# Patient Record
Sex: FEMALE | Race: WHITE
[De-identification: names, ages, dates, MRNs, and addresses within clinical notes are randomized per-mention and may not be internally consistent; named-entity substitution may affect disease eponyms.]

---

## 2017-05-12 ENCOUNTER — HOSPITAL ENCOUNTER (OUTPATIENT)
Dept: HOSPITAL 45 - C.LABSPEC | Age: 27
Discharge: HOME | End: 2017-05-12
Attending: PHYSICIAN ASSISTANT
Payer: COMMERCIAL

## 2017-05-12 DIAGNOSIS — N89.8: Primary | ICD-10-CM

## 2017-05-16 ENCOUNTER — HOSPITAL ENCOUNTER (OUTPATIENT)
Dept: HOSPITAL 45 - C.LABSPEC | Age: 27
Discharge: HOME | End: 2017-05-16
Attending: OBSTETRICS & GYNECOLOGY
Payer: COMMERCIAL

## 2017-05-16 DIAGNOSIS — Z34.91: Primary | ICD-10-CM

## 2017-05-16 LAB
APPEARANCE UR: CLEAR
BILIRUB UR-MCNC: (no result) MG/DL
COLOR UR: YELLOW
MANUAL MICROSCOPIC REQUIRED?: NO
NITRITE UR QL STRIP: (no result)
PH UR STRIP: 6 [PH] (ref 4.5–7.5)
REVIEW REQ?: NO
SP GR UR STRIP: 1.01 (ref 1–1.03)
URINE BILL WITH OR WITHOUT MIC: (no result)
UROBILINOGEN UR-MCNC: (no result) MG/DL

## 2017-05-23 ENCOUNTER — HOSPITAL ENCOUNTER (OUTPATIENT)
Dept: HOSPITAL 45 - C.LABSPEC | Age: 27
Discharge: HOME | End: 2017-05-23
Attending: OBSTETRICS & GYNECOLOGY
Payer: COMMERCIAL

## 2017-05-23 ENCOUNTER — HOSPITAL ENCOUNTER (OUTPATIENT)
Dept: HOSPITAL 45 - C.LAB1850 | Age: 27
Discharge: HOME | End: 2017-05-23
Attending: OBSTETRICS & GYNECOLOGY
Payer: COMMERCIAL

## 2017-05-23 ENCOUNTER — HOSPITAL ENCOUNTER (OUTPATIENT)
Dept: HOSPITAL 45 - C.PAPS | Age: 27
Discharge: HOME | End: 2017-05-23
Attending: OBSTETRICS & GYNECOLOGY
Payer: COMMERCIAL

## 2017-05-23 DIAGNOSIS — Z34.91: Primary | ICD-10-CM

## 2017-05-23 LAB
BASOPHILS # BLD: 0.02 K/UL (ref 0–0.2)
BASOPHILS NFR BLD: 0.2 %
COMPLETE: YES
EOSINOPHIL NFR BLD AUTO: 263 K/UL (ref 130–400)
HCT VFR BLD CALC: 39.1 % (ref 37–47)
IG%: 0.1 %
IMM GRANULOCYTES NFR BLD AUTO: 27.3 %
LYMPHOCYTES # BLD: 2.27 K/UL (ref 1.2–3.4)
MCH RBC QN AUTO: 29.5 PG (ref 25–34)
MCHC RBC AUTO-ENTMCNC: 33 G/DL (ref 32–36)
MCV RBC AUTO: 89.5 FL (ref 80–100)
MONOCYTES NFR BLD: 6.7 %
NEUTROPHILS # BLD AUTO: 1.4 %
NEUTROPHILS NFR BLD AUTO: 64.3 %
PMV BLD AUTO: 10.4 FL (ref 7.4–10.4)
RBC # BLD AUTO: 4.37 M/UL (ref 4.2–5.4)
WBC # BLD AUTO: 8.32 K/UL (ref 4.8–10.8)

## 2017-05-26 LAB
CHLAMYDIA TRACH RNA***: NOT DETECTED
GC (NEIS GONORRHOEAE)RNA**: NOT DETECTED

## 2017-07-25 ENCOUNTER — HOSPITAL ENCOUNTER (OUTPATIENT)
Dept: HOSPITAL 45 - C.LAB1850 | Age: 27
Discharge: HOME | End: 2017-07-25
Attending: OBSTETRICS & GYNECOLOGY
Payer: COMMERCIAL

## 2017-07-25 DIAGNOSIS — Z34.91: Primary | ICD-10-CM

## 2017-07-25 LAB — GTGD: 50 GRAMS

## 2017-10-10 ENCOUNTER — HOSPITAL ENCOUNTER (OUTPATIENT)
Dept: HOSPITAL 45 - C.LAB1850 | Age: 27
Discharge: HOME | End: 2017-10-10
Attending: OBSTETRICS & GYNECOLOGY
Payer: COMMERCIAL

## 2017-10-10 DIAGNOSIS — Z34.92: Primary | ICD-10-CM

## 2017-10-10 LAB
APPEARANCE UR: CLEAR
BILIRUB UR-MCNC: (no result) MG/DL
COLOR UR: YELLOW
GTGD: 50 GRAMS
HCT VFR BLD CALC: 32.7 % (ref 37–47)
MANUAL MICROSCOPIC REQUIRED?: NO
NITRITE UR QL STRIP: (no result)
PH UR STRIP: 6.5 [PH] (ref 4.5–7.5)
REVIEW REQ?: NO
SP GR UR STRIP: 1.01 (ref 1–1.03)
URINE EPITHELIAL CELL AUTO: >30 /LPF (ref 0–5)
UROBILINOGEN UR-MCNC: (no result) MG/DL

## 2017-12-05 ENCOUNTER — HOSPITAL ENCOUNTER (OUTPATIENT)
Dept: HOSPITAL 45 - C.LABSPEC | Age: 27
Discharge: HOME | End: 2017-12-05
Attending: OBSTETRICS & GYNECOLOGY
Payer: COMMERCIAL

## 2017-12-05 DIAGNOSIS — Z34.93: Primary | ICD-10-CM

## 2018-01-03 ENCOUNTER — HOSPITAL ENCOUNTER (INPATIENT)
Dept: HOSPITAL 45 - C.LD | Age: 28
LOS: 2 days | Discharge: HOME | End: 2018-01-05
Attending: OBSTETRICS & GYNECOLOGY | Admitting: OBSTETRICS & GYNECOLOGY
Payer: COMMERCIAL

## 2018-01-03 ENCOUNTER — HOSPITAL ENCOUNTER (OUTPATIENT)
Dept: HOSPITAL 45 - C.LD | Age: 28
Discharge: HOME | End: 2018-01-03
Attending: OBSTETRICS & GYNECOLOGY
Payer: COMMERCIAL

## 2018-01-03 VITALS
WEIGHT: 190.48 LBS | HEIGHT: 65.98 IN | HEIGHT: 65.98 IN | BODY MASS INDEX: 30.61 KG/M2 | WEIGHT: 190.48 LBS | BODY MASS INDEX: 30.61 KG/M2

## 2018-01-03 VITALS
BODY MASS INDEX: 30.93 KG/M2 | HEIGHT: 65.98 IN | HEIGHT: 65.98 IN | WEIGHT: 192.46 LBS | WEIGHT: 192.46 LBS | BODY MASS INDEX: 30.93 KG/M2

## 2018-01-03 DIAGNOSIS — O48.0: Primary | ICD-10-CM

## 2018-01-03 DIAGNOSIS — Z3A.40: ICD-10-CM

## 2018-01-03 LAB
EOSINOPHIL NFR BLD AUTO: 281 K/UL (ref 130–400)
HCT VFR BLD CALC: 35.9 % (ref 37–47)
HGB BLD-MCNC: 12.1 G/DL (ref 12–16)
MCH RBC QN AUTO: 30 PG (ref 25–34)
MCHC RBC AUTO-ENTMCNC: 33.7 G/DL (ref 32–36)
MCV RBC AUTO: 89.1 FL (ref 80–100)
PMV BLD AUTO: 11 FL (ref 7.4–10.4)
RED CELL DISTRIBUTION WIDTH CV: 13.6 % (ref 11.5–14.5)
RED CELL DISTRIBUTION WIDTH SD: 44.7 FL (ref 36.4–46.3)
WBC # BLD AUTO: 11.11 K/UL (ref 4.8–10.8)

## 2018-01-03 PROCEDURE — 0KQM0ZZ REPAIR PERINEUM MUSCLE, OPEN APPROACH: ICD-10-PCS | Performed by: OBSTETRICS & GYNECOLOGY

## 2018-01-03 NOTE — ANESTHESIA PROCEDURE NOTE
Anesthesia Epidural Removal Nt


Date & Time


Tony 3, 2018 at 22:21





Vital Signs


Pain Intensity:  0.0





Notes


Mental Status:  alert / awake / arousable, participated in evaluation


Nausea / Vomiting:  adequately controlled


Pain:  adequately controlled


Airway Patency, RR, SpO2:  stable & adequate


BP & HR:  stable & adequate


Hydration State:  stable & adequate


Neuraxial Anesthesia:  was administered, sensory block is resolving


Anesthetic Complications:  no major complications apparent, pt satisfied with 

anesthetic care


Epidural:  removed without complications, with tip intact

## 2018-01-03 NOTE — DELIVERY SUMMARY
DATE OF OPERATION:  2018

 

DATE OF DELIVERY:  2018

 

FINDINGS:  Viable male infant with Apgars of 7 and 8.  Arterial and venous

cord gases are pending.  Moderate meconium with meconium stained baby and

placenta.  Placenta sent for pathological evaluation.  Small midline

laceration repaired with interrupted 4-0 Vicryl.  Estimated blood loss 300

mL.

 

LABOR NOTE:  The patient is a 27-year-old  3, para 1 with an EDC of

 at 40+ weeks gestational age; who presented to labor and

delivery in active labor.  The patient had been seen earlier in the office

today.  Contractions increased in intensity, and patient presented to labor

and delivery.  The patient denies rupture of membranes.  The patient's

prenatal course remarkable for possible debris noted in the lateral

ventricles at a 20-week anatomy ultrasound.  The patient had a subsequent

maternal fetal medicine consult, who could not confirm this finding. 

Incidental polyhydramnios was noted at the maternal fetal medicine consult,

but that resolved spontaneously.  Blood types for the pregnancy for A

positive, antibody negative, rubella immune.  Hepatitis B negative.  She

declined a quad screen.  She had normal 1 hour Glucola x2 and a negative 3rd

trimester beta strep culture.  Upon admission, patient was 7 to 8 cm dilated,

100% effaced and +1 station.  Tracing was category 2.  The patient requested

and received an epidural.  Following the epidural, patient was fully dilated.

 Artificial rupture of membranes for moderate meconium fluid, dark stained,

but no particulate matter.  The patient went on to deliver over the next 3

contractions, delivering a viable male infant.  Good cry at birth terminating

meconium resuscitation.  Cord was clamped and cut.  Cord gases, cord blood

samples obtained.  Placenta was delivered spontaneously.  There was some

uterine atony, which was treated with IM Methergine along with the IV

Pitocin.  Small midline laceration repaired with an interrupted 4-0 Vicryl

suture.  Estimated blood loss 300 mL.  Sponge and needle count was correct.

 

 

I attest to the content of the Intraoperative Record and any orders documented therein. Any exception
s are noted below.

## 2018-01-04 VITALS — SYSTOLIC BLOOD PRESSURE: 119 MMHG | TEMPERATURE: 97.88 F | DIASTOLIC BLOOD PRESSURE: 71 MMHG | HEART RATE: 105 BPM

## 2018-01-04 VITALS — SYSTOLIC BLOOD PRESSURE: 116 MMHG | TEMPERATURE: 98.06 F | HEART RATE: 94 BPM | DIASTOLIC BLOOD PRESSURE: 78 MMHG

## 2018-01-04 VITALS
TEMPERATURE: 97.52 F | DIASTOLIC BLOOD PRESSURE: 83 MMHG | HEART RATE: 87 BPM | OXYGEN SATURATION: 99 % | SYSTOLIC BLOOD PRESSURE: 125 MMHG

## 2018-01-04 VITALS — SYSTOLIC BLOOD PRESSURE: 118 MMHG | HEART RATE: 101 BPM | DIASTOLIC BLOOD PRESSURE: 75 MMHG | TEMPERATURE: 98.6 F

## 2018-01-04 VITALS — TEMPERATURE: 97.88 F | SYSTOLIC BLOOD PRESSURE: 116 MMHG | HEART RATE: 87 BPM | DIASTOLIC BLOOD PRESSURE: 71 MMHG

## 2018-01-04 VITALS — DIASTOLIC BLOOD PRESSURE: 87 MMHG | SYSTOLIC BLOOD PRESSURE: 133 MMHG | HEART RATE: 101 BPM

## 2018-01-04 VITALS — HEART RATE: 99 BPM | SYSTOLIC BLOOD PRESSURE: 126 MMHG | TEMPERATURE: 98.24 F | DIASTOLIC BLOOD PRESSURE: 79 MMHG

## 2018-01-04 LAB
HCT VFR BLD CALC: 31.7 % (ref 37–47)
HGB BLD-MCNC: 10.5 G/DL (ref 12–16)

## 2018-01-04 RX ADMIN — DOCUSATE SODIUM SCH MG: 100 CAPSULE, LIQUID FILLED ORAL at 19:45

## 2018-01-04 RX ADMIN — DOCUSATE SODIUM SCH MG: 100 CAPSULE, LIQUID FILLED ORAL at 09:29

## 2018-01-04 RX ADMIN — FERROUS SULFATE TAB EC 325 MG (65 MG FE EQUIVALENT) SCH MG: 325 (65 FE) TABLET DELAYED RESPONSE at 09:29

## 2018-01-04 NOTE — OB/GYN PROGRESS NOTE
OB/GYN Progress Note


Date of Service


2018.





Subjective


conversation w/ patient, physical exam, chart review, lab review


Ambulation:  ambulating normally


Voiding:  no voiding problems


Passing Gas:  Yes


Diet Tolerance:  Regular Diet


Lochia:  Small


Feeding Type:  Breast Feeding





Review of Systems


Constitutional:  No fever


Respiratory:  No cough, No shortness of breath


Cardiac:  No chest pain, No edema


Abdomen:  No pain, No nausea, No vomiting


Female :  No dysuria





Objective


Vital Signs











  Date Time  Temp Pulse Resp B/P (MAP) Pulse Ox O2 Delivery O2 Flow Rate FiO2


 


18 04:22 36.6 87 16 116/71 (86)  Room Air  


 


18 00:54      Room Air  


 


18 00:51 36.6 105 18 119/71 (87)  Room Air  











Physical Exam


General Appearance:  WELL-APPEARING, WD/WN, NO APPARENT DISTRESS


Respiratory/Chest:  lungs clear, normal breath sounds


Cardiovascular:  regular rate, rhythm, no edema


Abdomen:  non tender, soft


Fundus:  Firm


Extremities:  non-tender, normal inspection, no pedal edema





Laboratory Results





Last 24 Hours








Test


  1/3/18


20:42 18


04:44


 


White Blood Count 11.11 K/uL  


 


Red Blood Count 4.03 M/uL  


 


Hemoglobin 12.1 g/dL  


 


Hematocrit 35.9 %  


 


Mean Corpuscular Volume 89.1 fL  


 


Mean Corpuscular Hemoglobin 30.0 pg  


 


Mean Corpuscular Hemoglobin


Concent 33.7 g/dl 


  


 


 


RDW Standard Deviation 44.7 fL  


 


RDW Coefficient of Variation 13.6 %  


 


Platelet Count 281 K/uL  


 


Mean Platelet Volume 11.0 fL  











Assessment and Plan


Post-Partum


Day Number:  1


Continue Routine Care:


27 F  now 2 


Delivered vaginally on 1/3 22:07 @ 40 wks 


Pt is GBS-/A+/RI. Pt is doing well clinically. Reviewed vital signs, WNL. 


Hgb 12.1 on admission, 10.5 today. 





Plan, continue postpartum care;





1. Encourage ambulation 


2. Monitor lochia 


3. Support breastfeeding 





Resident Physician Supervision Note:





I interviewed and examined the patient. Discussed with Dr. Morris and agree 

with findings and plan as documented in the note. Any exceptions or 

clarifications are listed here: [None]





Documented By:  Mani Quevedo

## 2018-01-05 VITALS — TEMPERATURE: 98.24 F | SYSTOLIC BLOOD PRESSURE: 121 MMHG | HEART RATE: 90 BPM | DIASTOLIC BLOOD PRESSURE: 79 MMHG

## 2018-01-05 VITALS — TEMPERATURE: 98.24 F | HEART RATE: 90 BPM | DIASTOLIC BLOOD PRESSURE: 79 MMHG

## 2018-01-05 RX ADMIN — DOCUSATE SODIUM SCH MG: 100 CAPSULE, LIQUID FILLED ORAL at 08:33

## 2018-01-05 RX ADMIN — FERROUS SULFATE TAB EC 325 MG (65 MG FE EQUIVALENT) SCH MG: 325 (65 FE) TABLET DELAYED RESPONSE at 08:33

## 2018-01-05 NOTE — DISCHARGE INSTRUCTIONS
Discharge Instructions


Date of Service


Jan 5, 2018.





Admission


Reason for Admission:  Check Labor





Discharge


Discharge Diagnosis / Problem:  vaginal delivery





Discharge Goals


Goal(s):  Routine recovery after delivery





Medications


Continue Dispensed Medications:  supercream, dermaplast, tucks, lansinoh





Activity Recommendations


Activity Limitations:  per Instructions/Follow-up section





.





Instructions / Follow-Up


Instructions / Follow-Up





ACTIVITY RECOMMENDATIONS:





* Gradual return to full activity over the next 2-3 weeks.


* No lifting - nothing heavier than baby over the next 2-3 weeks.


* Do not engage in vigorous exercise, sexual activity or sports until cleared by


   your physician.


* Do not drive or operate any motorized equipment until cleared by your 

physician.


* You may shower/bathe daily.








MEDICATIONS:





For discomfort or pain, you may use Acetaminophen (Tylenol), Ibuprofen (Advil),


or Naproxen (Aleve) following the package directions. For constipation you may 


use Colace following the package directions.








BREAST CARE:





If you are not breast feeding:





*  Wear a supportive bra 24 hours a day for one to two weeks.


*  Avoid stimulating your breasts and nipples as much as possible during the 

first 


    few weeks after delivery.


*  When taking a shower, have the warm water hit your back, not breasts.


*  When your breasts feel full, apply ice packs.  Usually three to four times a 

day


    helps ease the discomfort.


*  Take a mild pain medication (Tylenol / Motrin) when you are uncomfortable.





If breast feeding:





*  Use breast milk to lubricate nipples.  Lansinoh cream may be used for sore 

nipples. 


    You do not need to remove cream prior to breast feeding.  If using a 

different brand


   of cream, check the label for directions regarding removal of cream prior to 

nursing.


*  Wear a supportive bra.


*  If having problems with breasts or breast feeding, call a lactation 

consultant 


    or your health care provider.








EPISIOTOMY CARE:





After delivery, if you have an episiotomy (stitches), the following steps will 

ease


discomfort and aid healing.





*  For the first 24 hours after delivery, place ice packs next to your 

episiotomy to


   help reduce swelling.


*  After the first 24 hour-period, sitz baths, either portable or in the tub, 

are suggested.


    A shower with a shower arm sprayed over the episiotomy may be comforting.


*  Nessa care should be done after each voiding and bowel movement.  Squirt warm 

water


    from a plastic bottle over the perineum (region of the body between the 

anus and 


    urinary opening) and pat dry.


*  Use Dermoplast to ease discomfort.  Shake container.  Spray directly over 

the 


    episiotomy.  Place a Tucks on a clean sanitary pad next to your episiotomy.








SPECIAL CARE INSTRUCTIONS:





When you are discharged from the hospital, it is important for you to follow 

the instructions 


listed below:





*  During the first week at home, you should be able to care for yourself and 

your baby.


    In addition, the usual light household activities are encouraged.





*  Limit your activities to the way you feel.  Do not try to clean the house or 

move 


    furniture. Be sensible.





*  If you actively engage in sports and have done so up until the time of your 

delivery, 


    you may resume these activities as soon as you feel able.  This may take up 

to one 


    month or even longer.  Use good judgment.





*  Continue to take your prenatal vitamins for at least six weeks after the 

birth of your baby.





*  Your diet need not be limited unless you were on a special diet before your 

delivery.  


    Breast-feeding mothers need around 2500 calories per day and at least 64-80 

ounces of 


    fluid per day (8 to 10 glasses).





*  You should eat foods from the four major food groups.  Crash diets or fad 

diets are to be 


    avoided.  Eating lean meats, fresh fruits and vegetables, low-fat dairy 

products, high fiber


    foods and a regular exercise program, will help you get back to your pre-

pregnancy weight


    without putting your health at risk.





*  Constipation is sometimes a problem after delivery.  Take a mild laxative as 

needed.  If 


    breast feeding, Milk of Magnesia is acceptable to use. You may use a 

suppository or Fleets


    enema if no episiotomy.





*  A daily shower or tub bath is suggested.  Be sure to thoroughly and gently 

dry the perineum.





*  A bloody vaginal discharge will usually continue until around four weeks 

post partum.  A 


    small amount of bleeding may continue for as long as six weeks.  Vaginal 

discharge changes


    from the bright red bleeding after delivery to pink then brownish and 

finally yellowish-pink 


    before becoming white and disappearing.





*  Bleeding may increase with activity.  Your first period may come in 4-8 

weeks.  If you are 


    breast feeding, your period may be delayed even longer.





*  Citronelle (sex) can begin whenever both you and your partner feel 

comfortable and do 


    not have any form of genital infection.  It is recommended that you wait at 

least six weeks


    for internal and external healing to occur.  If you have questions, please 

talk to your health


    care practitioner.  A condom should be used to prevent infection and 

pregnancy.





*  Foreplay, gentle intercourse and lubrication is very important the first 

several times to 


    prevent pain.  A water-based lubricant such as K-Y jelly or Astroglide may 

be used.





*  If you have RH negative blood and your baby is RH positive, you will receive 

RHOGAM by 


    injection prior to discharge.  The nurse will give you a card to keep with 

you that has the


    date and place that you received RHOGAM after delivery.





*  During your prenatal care, you had a Rubella screen done to check for the 

presence of 


    rubella antibodies in your blood.  If your test was negative, you will 

receive a Rubella 


    vaccine prior to discharge.  This vaccine may cause a fever, soreness at 

the injection site


    and flu-like symptoms.  If these symptoms persist, notify your health care 

practitioner.  


    Pregnancy is not advised for one month after a Rubella vaccine.





*  Verbalizes understanding of car seat law as reviewed with patient nursing.





*  Car Seat hand-out given and reviewed with patient by nursing.





*  Shaken baby information reviewed with patient by nursing.


 


Call you doctor if:





*  Heavy bleeding (saturating several pads an hour) or passing clots the size 

of your fist.


*  A fever >101 degrees F (38.3 degrees C) on two occasions four hours apart and

/or chills.


*  Unusual pain in the pelvic or vaginal areas.


* "Baby Blues" lasting longer than two weeks.





If you have any questions or concerns, call your health care practitioner at 


[phone].








FOLLOW UP VISIT:





*  Please call the office at [phone] to schedule a 6 week postpartum 


    examination.  It is important you keep this appointment.  It is important 


    for you to make arrangements for either yearly or twice yearly check-ups 


    thereafter.





Current Hospital Diet


Patient's current hospital diet: Regular OB Diet





Discharge Diet


Recommended Diet:  Regular OB Diet





Pending Studies


Studies pending at discharge:  no





Medical Emergencies








.


Who to Call and When:





Medical Emergencies:  If at any time you feel your situation is an emergency, 

please call 911 immediately.





.





Non-Emergent Contact


Non-Emergency issues call your:  Gynecologist





.


.








"Provider Documentation" section prepared by Russell Morris.








.





VTE Core Measure


Inpt VTE Proph given/why not?:  Treatment not indicated

## 2018-01-05 NOTE — OB/GYN PROGRESS NOTE
OB/GYN Progress Note


Date of Service


2018.





Subjective


conversation w/ patient, conversation w/ family, physical exam, chart review, 

lab review


Ambulation:  ambulating normally


Voiding:  no voiding problems


Passing Gas:  Yes


Diet Tolerance:  Regular Diet


Lochia:  Small


Feeding Type:  Breast Feeding


Pain:  moderate cramping with feeding





Review of Systems


Constitutional:  No fever, No chills


Respiratory:  No cough, No shortness of breath


Cardiac:  No chest pain, No palpitations


Abdomen:  No pain, No nausea, No vomiting


Female :  No dysuria





Objective


Vital Signs











  Date Time  Temp Pulse Resp B/P (MAP) Pulse Ox O2 Delivery O2 Flow Rate FiO2


 


18 23:45  101 20 133/87 (102)  Room Air  


 


18 23:45      Room Air  


 


18 19:40 37.0 101 18 118/75 (89)  Room Air  


 


18 16:30      Room Air  


 


18 16:00 36.7 94 18 116/78 (91)  Room Air  


 


18 12:20 36.8 99 18 126/79 (95)  Room Air  


 


18 08:00 36.4 87 18 125/83 (97) 99 Room Air  


 


18 08:00     99 Room Air  











Physical Exam


General Appearance:  WELL-APPEARING, WD/WN, NO APPARENT DISTRESS


Respiratory/Chest:  lungs clear, normal breath sounds


Cardiovascular:  regular rate, rhythm, no murmur


Abdomen:  non tender, soft


Fundus:  Firm, Relation to Umbilicus (below the umbilicus )


Extremities:  normal inspection, no pedal edema, no calf tenderness





Assessment and Plan


Post-Partum


Day Number:  2


Continue Routine Care:


27 F  now 2 


Delivered vaginally on 1/3 22:07 @ 40 wks 


Pt is GBS-/A+/RI. Pt is doing well clinically. Reviewed vital signs, WNL. 


Hgb 12.1 on admission, 10.5 yesterday 





Plan, continue postpartum care;





1. DC home today/Discussed discharge instructions with the patient 





Resident Physician Supervision Note:





I was present with Dr. Morris during the history and exam. I discussed the 

case with the resident and agree with the findings and plan as documented in 

the note.  Any exceptions or clarifications are listed here: PPD#2 doing well. 

Anticipate discharge home today. Discharge instructions discussed. RTO 6w.





Documented By:  Kaitlyn B Brunner